# Patient Record
Sex: FEMALE | Race: WHITE | NOT HISPANIC OR LATINO | URBAN - METROPOLITAN AREA
[De-identification: names, ages, dates, MRNs, and addresses within clinical notes are randomized per-mention and may not be internally consistent; named-entity substitution may affect disease eponyms.]

---

## 2017-03-27 ENCOUNTER — APPOINTMENT (OUTPATIENT)
Dept: GENERAL RADIOLOGY | Facility: HOSPITAL | Age: 32
End: 2017-03-27

## 2017-03-27 ENCOUNTER — HOSPITAL ENCOUNTER (EMERGENCY)
Facility: HOSPITAL | Age: 32
Discharge: HOME | End: 2017-03-28
Attending: FAMILY MEDICINE | Admitting: FAMILY MEDICINE

## 2017-03-27 DIAGNOSIS — A49.9 UTI (URINARY TRACT INFECTION), BACTERIAL: Primary | ICD-10-CM

## 2017-03-27 DIAGNOSIS — J06.9 VIRAL URI: ICD-10-CM

## 2017-03-27 DIAGNOSIS — N39.0 UTI (URINARY TRACT INFECTION), BACTERIAL: Primary | ICD-10-CM

## 2017-03-27 LAB
ALBUMIN SERPL-MCNC: 4.1 G/DL (ref 3.5–5.2)
ALBUMIN/GLOB SERPL: 1 G/DL
ALP SERPL-CCNC: 128 U/L (ref 39–117)
ALT SERPL W P-5'-P-CCNC: 18 U/L (ref 1–33)
ANION GAP SERPL CALCULATED.3IONS-SCNC: 13.1 MMOL/L
AST SERPL-CCNC: 14 U/L (ref 1–32)
BACTERIA UR QL AUTO: ABNORMAL /HPF
BASOPHILS # BLD AUTO: 0.02 10*3/MM3 (ref 0–0.2)
BASOPHILS NFR BLD AUTO: 0.2 % (ref 0–1.5)
BILIRUB SERPL-MCNC: 0.3 MG/DL (ref 0.1–1.2)
BILIRUB UR QL STRIP: NEGATIVE
BUN BLD-MCNC: 9 MG/DL (ref 6–20)
BUN/CREAT SERPL: 11.8 (ref 7–25)
CALCIUM SPEC-SCNC: 9.2 MG/DL (ref 8.6–10.5)
CHLORIDE SERPL-SCNC: 102 MMOL/L (ref 98–107)
CLARITY UR: ABNORMAL
CO2 SERPL-SCNC: 24.9 MMOL/L (ref 22–29)
COLOR UR: YELLOW
CREAT BLD-MCNC: 0.76 MG/DL (ref 0.57–1)
D-LACTATE SERPL-SCNC: 1.3 MMOL/L (ref 0.5–2)
DEPRECATED RDW RBC AUTO: 43.9 FL (ref 37–54)
EOSINOPHIL # BLD AUTO: 0.07 10*3/MM3 (ref 0–0.7)
EOSINOPHIL NFR BLD AUTO: 0.6 % (ref 0.3–6.2)
ERYTHROCYTE [DISTWIDTH] IN BLOOD BY AUTOMATED COUNT: 13.9 % (ref 11.7–13)
FLUAV AG NPH QL: NEGATIVE
FLUBV AG NPH QL IA: NEGATIVE
GFR SERPL CREATININE-BSD FRML MDRD: 88 ML/MIN/1.73
GLOBULIN UR ELPH-MCNC: 4.2 GM/DL
GLUCOSE BLD-MCNC: 91 MG/DL (ref 65–99)
GLUCOSE UR STRIP-MCNC: NEGATIVE MG/DL
HCG SERPL QL: NEGATIVE
HCT VFR BLD AUTO: 39.2 % (ref 35.6–45.5)
HGB BLD-MCNC: 13 G/DL (ref 11.9–15.5)
HGB UR QL STRIP.AUTO: ABNORMAL
HOLD SPECIMEN: NORMAL
HYALINE CASTS UR QL AUTO: ABNORMAL /LPF
IMM GRANULOCYTES # BLD: 0.02 10*3/MM3 (ref 0–0.03)
IMM GRANULOCYTES NFR BLD: 0.2 % (ref 0–0.5)
KETONES UR QL STRIP: NEGATIVE
LEUKOCYTE ESTERASE UR QL STRIP.AUTO: ABNORMAL
LYMPHOCYTES # BLD AUTO: 2.25 10*3/MM3 (ref 0.9–4.8)
LYMPHOCYTES NFR BLD AUTO: 18.1 % (ref 19.6–45.3)
MCH RBC QN AUTO: 29 PG (ref 26.9–32)
MCHC RBC AUTO-ENTMCNC: 33.2 G/DL (ref 32.4–36.3)
MCV RBC AUTO: 87.3 FL (ref 80.5–98.2)
MONOCYTES # BLD AUTO: 0.52 10*3/MM3 (ref 0.2–1.2)
MONOCYTES NFR BLD AUTO: 4.2 % (ref 5–12)
NEUTROPHILS # BLD AUTO: 9.52 10*3/MM3 (ref 1.9–8.1)
NEUTROPHILS NFR BLD AUTO: 76.7 % (ref 42.7–76)
NITRITE UR QL STRIP: NEGATIVE
PH UR STRIP.AUTO: 6.5 [PH] (ref 5–8)
PLATELET # BLD AUTO: 260 10*3/MM3 (ref 140–500)
PMV BLD AUTO: 9.5 FL (ref 6–12)
POTASSIUM BLD-SCNC: 4 MMOL/L (ref 3.5–5.2)
PROT SERPL-MCNC: 8.3 G/DL (ref 6–8.5)
PROT UR QL STRIP: NEGATIVE
RBC # BLD AUTO: 4.49 10*6/MM3 (ref 3.9–5.2)
RBC # UR: ABNORMAL /HPF
REF LAB TEST METHOD: ABNORMAL
SODIUM BLD-SCNC: 140 MMOL/L (ref 136–145)
SP GR UR STRIP: 1.01 (ref 1–1.03)
SQUAMOUS #/AREA URNS HPF: ABNORMAL /HPF
UROBILINOGEN UR QL STRIP: ABNORMAL
WBC NRBC COR # BLD: 12.4 10*3/MM3 (ref 4.5–10.7)
WBC UR QL AUTO: ABNORMAL /HPF
WHOLE BLOOD HOLD SPECIMEN: NORMAL
WHOLE BLOOD HOLD SPECIMEN: NORMAL

## 2017-03-27 PROCEDURE — 71020 HC CHEST PA AND LATERAL: CPT

## 2017-03-27 PROCEDURE — 99284 EMERGENCY DEPT VISIT MOD MDM: CPT

## 2017-03-27 PROCEDURE — 87070 CULTURE OTHR SPECIMN AEROBIC: CPT | Performed by: PHYSICIAN ASSISTANT

## 2017-03-27 PROCEDURE — 89050 BODY FLUID CELL COUNT: CPT | Performed by: PHYSICIAN ASSISTANT

## 2017-03-27 PROCEDURE — 36415 COLL VENOUS BLD VENIPUNCTURE: CPT

## 2017-03-27 PROCEDURE — 87205 SMEAR GRAM STAIN: CPT | Performed by: PHYSICIAN ASSISTANT

## 2017-03-27 PROCEDURE — 84157 ASSAY OF PROTEIN OTHER: CPT | Performed by: PHYSICIAN ASSISTANT

## 2017-03-27 PROCEDURE — 87086 URINE CULTURE/COLONY COUNT: CPT | Performed by: PHYSICIAN ASSISTANT

## 2017-03-27 PROCEDURE — 83605 ASSAY OF LACTIC ACID: CPT | Performed by: FAMILY MEDICINE

## 2017-03-27 PROCEDURE — 87015 SPECIMEN INFECT AGNT CONCNTJ: CPT | Performed by: PHYSICIAN ASSISTANT

## 2017-03-27 PROCEDURE — 96360 HYDRATION IV INFUSION INIT: CPT

## 2017-03-27 PROCEDURE — 87804 INFLUENZA ASSAY W/OPTIC: CPT | Performed by: PHYSICIAN ASSISTANT

## 2017-03-27 PROCEDURE — 87040 BLOOD CULTURE FOR BACTERIA: CPT | Performed by: FAMILY MEDICINE

## 2017-03-27 PROCEDURE — 81001 URINALYSIS AUTO W/SCOPE: CPT | Performed by: PHYSICIAN ASSISTANT

## 2017-03-27 PROCEDURE — 85025 COMPLETE CBC W/AUTO DIFF WBC: CPT | Performed by: FAMILY MEDICINE

## 2017-03-27 PROCEDURE — 84703 CHORIONIC GONADOTROPIN ASSAY: CPT | Performed by: PHYSICIAN ASSISTANT

## 2017-03-27 PROCEDURE — 77003 FLUOROGUIDE FOR SPINE INJECT: CPT

## 2017-03-27 PROCEDURE — 80053 COMPREHEN METABOLIC PANEL: CPT | Performed by: FAMILY MEDICINE

## 2017-03-27 PROCEDURE — 82945 GLUCOSE OTHER FLUID: CPT | Performed by: PHYSICIAN ASSISTANT

## 2017-03-27 RX ORDER — LIDOCAINE WITH 8.4% SOD BICARB 0.9%(10ML)
10 SYRINGE (ML) INJECTION ONCE
Status: COMPLETED | OUTPATIENT
Start: 2017-03-27 | End: 2017-03-27

## 2017-03-27 RX ORDER — SODIUM CHLORIDE 0.9 % (FLUSH) 0.9 %
10 SYRINGE (ML) INJECTION AS NEEDED
Status: DISCONTINUED | OUTPATIENT
Start: 2017-03-27 | End: 2017-03-28 | Stop reason: HOSPADM

## 2017-03-27 RX ORDER — IBUPROFEN 800 MG/1
800 TABLET ORAL ONCE
Status: COMPLETED | OUTPATIENT
Start: 2017-03-27 | End: 2017-03-27

## 2017-03-27 RX ORDER — PRENATAL VIT NO.126/IRON/FOLIC 28MG-0.8MG
TABLET ORAL DAILY
COMMUNITY

## 2017-03-27 RX ADMIN — Medication 10 ML: at 22:49

## 2017-03-27 RX ADMIN — IBUPROFEN 800 MG: 800 TABLET ORAL at 19:48

## 2017-03-27 RX ADMIN — SODIUM CHLORIDE 1000 ML: 9 INJECTION, SOLUTION INTRAVENOUS at 19:49

## 2017-03-27 NOTE — ED PROVIDER NOTES
EMERGENCY DEPARTMENT ENCOUNTER    CHIEF COMPLAINT  Chief Complaint: neck pain  History given by: patient  History limited by: nothing  Room Number:   PMD: No Known Provider      HPI:  Pt is a 31 y.o. female who presents complaining of worsening neck pain onset 1 day ago. Pt states today she woke up w/ subjective fever, took a nap this afternoon, and after her nap c/o fever (102), HA but denies cough, sick contacts, sore throat, earache, dysuria. Pt went to a [facility]'s clinic today and was sent to the ED. Pt is 4 weeks post partum via  and is currently breast feeding. Pt states she had a bladder infection just prior to her delivery but was treated and denies any sx since.    Duration:  1 day  Onset: sudden  Timing: constant  Location: neck  Radiation: none  Quality: sore  Intensity/Severity: moderate  Progression: worsening  Associated Symptoms: HA, fever (102)  Aggravating Factors: none  Alleviating Factors: none  Previous Episodes: bladder infection 4 weeks ago  Treatment before arrival: 1000mg tylenol 2 hours ago w/o relief    PAST MEDICAL HISTORY  Active Ambulatory Problems     Diagnosis Date Noted   • No Active Ambulatory Problems     Resolved Ambulatory Problems     Diagnosis Date Noted   • No Resolved Ambulatory Problems     No Additional Past Medical History       PAST SURGICAL HISTORY  Past Surgical History:   Procedure Laterality Date   •  SECTION         FAMILY HISTORY  History reviewed. No pertinent family history.    SOCIAL HISTORY  Social History     Social History   • Marital status:      Spouse name: N/A   • Number of children: N/A   • Years of education: N/A     Occupational History   • Not on file.     Social History Main Topics   • Smoking status: Never Smoker   • Smokeless tobacco: Not on file   • Alcohol use No   • Drug use: No   • Sexual activity: Not on file     Other Topics Concern   • Not on file     Social History Narrative   • No narrative on file        ALLERGIES  Minocycline    REVIEW OF SYSTEMS  Review of Systems   Constitutional: Positive for fever (102). Negative for chills.   HENT: Negative for congestion and sore throat.    Eyes: Negative.    Respiratory: Negative for cough and shortness of breath.    Cardiovascular: Negative for chest pain and leg swelling.   Gastrointestinal: Negative for abdominal pain, diarrhea and vomiting.   Genitourinary: Negative for difficulty urinating and dysuria.   Musculoskeletal: Positive for neck pain. Negative for back pain.   Skin: Negative for rash and wound.   Allergic/Immunologic: Negative.    Neurological: Positive for headaches. Negative for dizziness, weakness and numbness.   Psychiatric/Behavioral: Negative.    All other systems reviewed and are negative.      PHYSICAL EXAM  ED Triage Vitals   Temp Heart Rate Resp BP SpO2   03/27/17 1745 03/27/17 1745 03/27/17 1745 03/27/17 1750 03/27/17 1750   102.2 °F (39 °C) 135 18 127/97 97 %      Temp src Heart Rate Source Patient Position BP Location FiO2 (%)   03/27/17 1745 03/27/17 1745 -- -- --   Tympanic Monitor          Physical Exam   Constitutional: She is oriented to person, place, and time and well-developed, well-nourished, and in no distress. No distress.   HENT:   Head: Normocephalic and atraumatic.   Right Ear: Tympanic membrane normal.   Left Ear: Tympanic membrane normal.   Eyes: EOM are normal. Pupils are equal, round, and reactive to light.   Neck: Normal range of motion. Neck supple.   No meningismus.   Cardiovascular: Regular rhythm and normal heart sounds.  Tachycardia present.    Pulmonary/Chest: Effort normal and breath sounds normal. No respiratory distress.   Abdominal: Soft. There is no tenderness. There is no rebound, no guarding and no CVA tenderness.   Musculoskeletal: Normal range of motion. She exhibits no edema.   Neurological: She is alert and oriented to person, place, and time. She has normal sensation and normal strength.   Skin: Skin is  warm and dry. No rash noted.   Psychiatric: Mood and affect normal.   Nursing note and vitals reviewed.      LAB RESULTS  Lab Results (last 24 hours)     Procedure Component Value Units Date/Time    Influenza Antigen [[id number]]  (Normal) Collected:  03/27/17 1847    Specimen:  Swab from Nasopharynx Updated:  03/27/17 1956     Influenza A Ag, EIA Negative     Influenza B Ag, EIA Negative    CBC & Differential [[id number]] Collected:  03/27/17 1848    Specimen:  Blood Updated:  03/27/17 1912    Narrative:       The following orders were created for panel order CBC & Differential.  Procedure                               Abnormality         Status                     ---------                               -----------         ------                     CBC Auto Differential[[id number]]         Abnormal            Final result                 Please view results for these tests on the individual orders.    Comprehensive Metabolic Panel [[id number]]  (Abnormal) Collected:  03/27/17 1848    Specimen:  Blood Updated:  03/27/17 1932     Glucose 91 mg/dL      BUN 9 mg/dL      Creatinine 0.76 mg/dL      Sodium 140 mmol/L      Potassium 4.0 mmol/L      Chloride 102 mmol/L      CO2 24.9 mmol/L      Calcium 9.2 mg/dL      Total Protein 8.3 g/dL      Albumin 4.10 g/dL      ALT (SGPT) 18 U/L      AST (SGOT) 14 U/L      Alkaline Phosphatase 128 (H) U/L      Total Bilirubin 0.3 mg/dL      eGFR Non African Amer 88 mL/min/1.73      Globulin 4.2 gm/dL      A/G Ratio 1.0 g/dL      BUN/Creatinine Ratio 11.8     Anion Gap 13.1 mmol/L     Lactic Acid, Plasma [[id number]]  (Normal) Collected:  03/27/17 1848    Specimen:  Blood Updated:  03/27/17 1915     Lactate 1.3 mmol/L     Blood Culture [[id number]] Collected:  03/27/17 1848    Specimen:  Blood from Arm, Right Updated:  03/27/17 1857    Blood Culture [[id number]] Collected:  03/27/17 1848    Specimen:  Blood from Arm, Left Updated:  03/27/17 1858    CBC Auto Differential [[id number]]  (Abnormal)  Collected:  03/27/17 1848    Specimen:  Blood Updated:  03/27/17 1912     WBC 12.40 (H) 10*3/mm3      RBC 4.49 10*6/mm3      Hemoglobin 13.0 g/dL      Hematocrit 39.2 %      MCV 87.3 fL      MCH 29.0 pg      MCHC 33.2 g/dL      RDW 13.9 (H) %      RDW-SD 43.9 fl      MPV 9.5 fL      Platelets 260 10*3/mm3      Neutrophil % 76.7 (H) %      Lymphocyte % 18.1 (L) %      Monocyte % 4.2 (L) %      Eosinophil % 0.6 %      Basophil % 0.2 %      Immature Grans % 0.2 %      Neutrophils, Absolute 9.52 (H) 10*3/mm3      Lymphocytes, Absolute 2.25 10*3/mm3      Monocytes, Absolute 0.52 10*3/mm3      Eosinophils, Absolute 0.07 10*3/mm3      Basophils, Absolute 0.02 10*3/mm3      Immature Grans, Absolute 0.02 10*3/mm3     hCG, Serum, Qualitative [[id number]]  (Normal) Collected:  03/27/17 1849    Specimen:  Blood Updated:  03/27/17 1931     HCG Qualitative Negative    Urinalysis With / Culture If Indicated [[id number]]  (Abnormal) Collected:  03/27/17 1935    Specimen:  Urine from Urine, Clean Catch Updated:  03/27/17 1954     Color, UA Yellow     Appearance, UA Cloudy (A)     pH, UA 6.5     Specific Gravity, UA 1.007     Glucose, UA Negative     Ketones, UA Negative     Bilirubin, UA Negative     Blood, UA Trace (A)     Protein, UA Negative     Leuk Esterase, UA Large (3+) (A)     Nitrite, UA Negative     Urobilinogen, UA 0.2 E.U./dL    Urinalysis, Microscopic Only [[id number]]  (Abnormal) Collected:  03/27/17 1935    Specimen:  Urine from Urine, Clean Catch Updated:  03/27/17 1954     RBC, UA 0-2 /HPF      WBC, UA 21-30 (A) /HPF      Bacteria, UA None Seen /HPF      Squamous Epithelial Cells, UA 0-2 /HPF      Hyaline Casts, UA 0-2 /LPF      Methodology Automated Microscopy    Urine Culture [[id number]] Collected:  03/27/17 1935    Specimen:  Urine from Urine, Clean Catch Updated:  03/27/17 1952    Glucose, CSF [[id number]]  (Normal) Collected:  03/27/17 2240    Specimen:  Cerebrospinal Fluid from Lumbar Puncture Updated:  03/28/17 0026      Glucose, CSF 70 mg/dL     Protein, CSF [[id number]]  (Normal) Collected:  03/27/17 2240    Specimen:  Cerebrospinal Fluid from Lumbar Puncture Updated:  03/28/17 0005     Protein, Total (CSF) 28.0 mg/dL     Cell Count With Differential, CSF Use tube: 1 [[id number]] Collected:  03/27/17 2240    Specimen:  Cerebrospinal Fluid from Lumbar Puncture Updated:  03/28/17 0043    Narrative:       The following orders were created for panel order Cell Count With Differential, CSF Use tube: 1.  Procedure                               Abnormality         Status                     ---------                               -----------         ------                     Cell Count, CSF[[id number]]               Abnormal            Final result                 Please view results for these tests on the individual orders.    Culture, CSF [[id number]] Collected:  03/27/17 2240    Specimen:  Cerebrospinal Fluid from Lumbar Puncture Updated:  03/28/17 0119     Gram Stain Result No organisms seen    Cell Count, CSF [[id number]]  (Abnormal) Collected:  03/27/17 2240    Specimen:  Cerebrospinal Fluid from Lumbar Puncture Updated:  03/28/17 0043     Color, CSF Colorless     Supernatant Color, CSF Colorless     Appearance, CSF Clear     RBC, CSF 3 (H) /mm3      Nucleated Cells, CSF 1 /mm3      Tube Number, CSF 1     Method: UF 1000i Automated Method    Narrative:       Differential not indicated.        I ordered the above labs and reviewed the results      RADIOLOGY  IR Lumbar Puncture Diagnosis   Final Result      XR Chest 2 View   Final Result   No evidence for acute pulmonary process. Follow-up as   clinical indications persist.       This report was finalized on 3/27/2017 8:53 PM by Dr. Albino Garcia MD.            I ordered the above noted radiological studies. Interpreted by radiologist. Reviewed by me in PACS.       PROCEDURES  Procedures      PROGRESS AND CONSULTS  ED Course     1842  Ordered flu swab, hcg for further  evaluation.      Ordered CXR and UA w/ culture for further evaluation.      Hcg is negative. Ordered ibuprofen for fever.      Rechecked pt who still c/o HA and neck pain. She states she has hx of sinus migraines, but this is different from those. Advised pt of continued wait for results. Pt understands and agrees with the plan. All questions answered.      Discussed pt w/ Dr. Andrew, who after evaluation agrees w/ course of care..  Spoke w/ Dr. Chaparro, radiology, regarding LP under fluoroscopy.       Ordered lumbar puncture to r/o meningitis.      Rechecked pt who is resting unchanged. D/w pt and family plan to order LP under fluoroscopy to r/o meningitis. Pt and family understand and agree with the plan. All questions answered.    133  Rechecked pt who is resting comfortably. D/w pt negative LP lab results and plan to d/c and treat for UTI. Advised pt of likely viral syndrome as well. She denies any abd tenderness, and abd exam and exam of  incision is benign. Gave RTER warnings. Pt understands and agrees with the plan. All questions answered.      MEDICAL DECISION MAKING  Results were reviewed/discussed with the patient and they were also made aware of online access. Pt also made aware that some labs, such as cultures, will not be resulted during ER visit and follow up with PMD is necessary.     MDM  Number of Diagnoses or Management Options     Amount and/or Complexity of Data Reviewed  Clinical lab tests: ordered and reviewed (UA shows large amount of WBC in microscopy.)  Tests in the radiology section of CPT®: ordered and reviewed (CXR shows nothing acute.)  Decide to obtain previous medical records or to obtain history from someone other than the patient: yes  Review and summarize past medical records: yes  Independent visualization of images, tracings, or specimens: yes    Patient Progress  Patient progress: stable         DIAGNOSIS  Final diagnoses:   UTI (urinary tract  infection), bacterial   Viral URI       DISPOSITION  DISCHARGE    Patient discharged in stable condition.    Reviewed implications of results, diagnosis, meds, responsibility to follow up, warning signs and symptoms of possible worsening, potential complications and reasons to return to ER, including worsening fever, chills.    Patient/Family voiced understanding of above instructions.    Discussed plan for discharge, as there is no emergent indication for admission.  Pt/family is agreeable and understands need for follow up and repeat testing.  Pt is aware that discharge does not mean that nothing is wrong but it indicates no emergency is present that requires admission and they must continue care with follow-up as given below or physician of their choice.     FOLLOW-UP  [facility]  [place] [id number] [phone]  Call in 1 day  For follow-up         Medication List      New Prescriptions          cefdinir 300 MG capsule   Commonly known as:  OMNICEF   Take 1 capsule by mouth 2 (Two) Times a Day.       naproxen sodium 550 MG tablet   Commonly known as:  ANAPROX   Take 1 tablet by mouth 2 (Two) Times a Day With Meals.           Latest Documented Vital Signs:  As of 2:40 AM  BP- 115/70 HR- 100 Temp- (!) 101.6 °F (38.7 °C) O2 sat- 99%    --  Documentation assistance provided by jaison Arshad for RADHA Hathaway.  Information recorded by the scribe was done at my direction and has been verified and validated by me.     Presley Arshad  03/28/17 0136       RADHA Gonsalez III  03/28/17 0240

## 2017-03-28 VITALS
SYSTOLIC BLOOD PRESSURE: 115 MMHG | TEMPERATURE: 101.6 F | BODY MASS INDEX: 34.07 KG/M2 | RESPIRATION RATE: 18 BRPM | OXYGEN SATURATION: 99 % | HEIGHT: 69 IN | HEART RATE: 100 BPM | DIASTOLIC BLOOD PRESSURE: 70 MMHG | WEIGHT: 230 LBS

## 2017-03-28 LAB
APPEARANCE CSF: CLEAR
COLOR CSF: COLORLESS
COLOR SPUN CSF: COLORLESS
GLUCOSE CSF-MCNC: 70 MG/DL (ref 40–70)
METHOD: ABNORMAL
NUC CELL # CSF MANUAL: 1 /MM3 (ref 0–5)
PROT CSF-MCNC: 28 MG/DL (ref 15–45)
RBC # CSF MANUAL: 3 /MM3 (ref 0–0)
TUBE # CSF: 1

## 2017-03-28 RX ORDER — NAPROXEN SODIUM 550 MG/1
550 TABLET ORAL 2 TIMES DAILY WITH MEALS
Qty: 20 TABLET | Refills: 0 | Status: SHIPPED | OUTPATIENT
Start: 2017-03-28

## 2017-03-28 RX ORDER — CEFDINIR 300 MG/1
300 CAPSULE ORAL 2 TIMES DAILY
Qty: 14 CAPSULE | Refills: 0 | Status: SHIPPED | OUTPATIENT
Start: 2017-03-28

## 2017-03-28 NOTE — ED PROVIDER NOTES
Pt presents to ED complaining of neck pain onset yesterday that worsened this morning. Pt also complains of HA, fever (102.2 upon arrival), chills and myalgias. She was sent here from [facility] for meningitis rule out. She denies cough, sore throat, dysuria, and any other sx at this time. Pt states she had a UTI 4 weeks ago and was treated with abx. She also completed Amoxicillin 5 days ago for tonsillitis. Pt is currently breastfeeding. Upon exam, pt is awake and alert. Heart is at regular rate and rhythm. Abd is benign. UA showed 3+ WBC and CXR is negative. LP results will be reviewed.     I supervised care provided by the midlevel provider.    We have discussed this patient's history, physical exam, and treatment plan.   I have reviewed the note and personally saw and examined the patient and agree with the plan of care.    Documentation assistance provided by jaison Morgan.  Information recorded by the scribe was done at my direction and has been verified and validated by me.        Anette Morgan  03/27/17 6703       Moose Andrew MD  03/28/17 4448

## 2017-03-28 NOTE — PROGRESS NOTES
Discharge Planning Assessment  [facility]     Patient Name: Sita Prado  MRN: [id number]  Today's Date: 3/27/2017    Admit Date: 3/27/2017          Discharge Needs Assessment     None            Discharge Plan       03/27/17 2000    Case Management/Social Work Plan    Plan Pt does not have a PCP, [facility] list with instructions given on how to use the referrral line. ELIF Rhodes RN        Discharge Placement     No information found                Demographic Summary     None            Functional Status     None            Psychosocial     None            Abuse/Neglect     None            Legal     None            Substance Abuse     None            Patient Forms       03/27/17 2001    Patient Forms    Provider Choice List Delivered   [facility] list    Delivered to Patient    Method of delivery In person          Sangita Rhodes, RN

## 2017-03-28 NOTE — ED NOTES
Consent in hand- pt wants to read over before she signs/ rad tech at bedside also explaining how things will proceed     Chantell Michelle RN  03/27/17 5842

## 2017-03-29 LAB — BACTERIA SPEC AEROBE CULT: NORMAL

## 2017-03-31 LAB
BACTERIA SPEC AEROBE CULT: NORMAL
GRAM STN SPEC: NORMAL

## 2017-04-01 LAB
BACTERIA SPEC AEROBE CULT: NORMAL
BACTERIA SPEC AEROBE CULT: NORMAL